# Patient Record
Sex: FEMALE | Race: OTHER | NOT HISPANIC OR LATINO
[De-identification: names, ages, dates, MRNs, and addresses within clinical notes are randomized per-mention and may not be internally consistent; named-entity substitution may affect disease eponyms.]

---

## 2022-01-10 PROBLEM — Z00.00 ENCOUNTER FOR PREVENTIVE HEALTH EXAMINATION: Status: ACTIVE | Noted: 2022-01-10

## 2022-01-11 ENCOUNTER — APPOINTMENT (OUTPATIENT)
Dept: HEART AND VASCULAR | Facility: CLINIC | Age: 67
End: 2022-01-11
Payer: MEDICARE

## 2022-01-11 VITALS
TEMPERATURE: 98.6 F | HEART RATE: 90 BPM | WEIGHT: 102 LBS | OXYGEN SATURATION: 97 % | BODY MASS INDEX: 19.26 KG/M2 | DIASTOLIC BLOOD PRESSURE: 82 MMHG | SYSTOLIC BLOOD PRESSURE: 122 MMHG | RESPIRATION RATE: 16 BRPM | HEIGHT: 61 IN

## 2022-01-11 DIAGNOSIS — Z78.9 OTHER SPECIFIED HEALTH STATUS: ICD-10-CM

## 2022-01-11 DIAGNOSIS — Z72.89 OTHER PROBLEMS RELATED TO LIFESTYLE: ICD-10-CM

## 2022-01-11 DIAGNOSIS — R10.9 UNSPECIFIED ABDOMINAL PAIN: ICD-10-CM

## 2022-01-11 DIAGNOSIS — R94.31 ABNORMAL ELECTROCARDIOGRAM [ECG] [EKG]: ICD-10-CM

## 2022-01-11 DIAGNOSIS — Z80.42 FAMILY HISTORY OF MALIGNANT NEOPLASM OF PROSTATE: ICD-10-CM

## 2022-01-11 DIAGNOSIS — Z82.49 FAMILY HISTORY OF ISCHEMIC HEART DISEASE AND OTHER DISEASES OF THE CIRCULATORY SYSTEM: ICD-10-CM

## 2022-01-11 DIAGNOSIS — H93.19 TINNITUS, UNSPECIFIED EAR: ICD-10-CM

## 2022-01-11 DIAGNOSIS — R07.89 OTHER CHEST PAIN: ICD-10-CM

## 2022-01-11 DIAGNOSIS — Z87.898 PERSONAL HISTORY OF OTHER SPECIFIED CONDITIONS: ICD-10-CM

## 2022-01-11 PROCEDURE — 99204 OFFICE O/P NEW MOD 45 MIN: CPT

## 2022-01-11 PROCEDURE — 36415 COLL VENOUS BLD VENIPUNCTURE: CPT

## 2022-01-11 PROCEDURE — 93000 ELECTROCARDIOGRAM COMPLETE: CPT

## 2022-01-11 RX ORDER — GALCANEZUMAB 120 MG/ML
120 INJECTION, SOLUTION SUBCUTANEOUS
Qty: 1 | Refills: 0 | Status: ACTIVE | COMMUNITY
Start: 2022-01-11

## 2022-01-11 RX ORDER — GABAPENTIN 100 MG/1
100 CAPSULE ORAL DAILY
Qty: 30 | Refills: 0 | Status: ACTIVE | COMMUNITY
Start: 2022-01-11

## 2022-01-11 NOTE — REVIEW OF SYSTEMS
[Headache] : headache [Tinnitus] : tinnitus [Nausea] : nausea [Negative] : Genitourinary [SOB] : no shortness of breath [Vomiting] : no vomiting [FreeTextEntry2] : see HPI [FreeTextEntry5] : see HPI [FreeTextEntry7] : see HPI [FreeTextEntry9] : see HPI [de-identified] : see HPI
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
## 2022-01-11 NOTE — PHYSICAL EXAM
[Well Developed] : well developed [Normal Conjunctiva] : normal conjunctiva [No Edema] : no edema [No Rash] : no rash [Moves all extremities] : moves all extremities [Normal] : alert and oriented, normal memory

## 2022-01-11 NOTE — ASSESSMENT
[FreeTextEntry1] : An EKG was performed to evaluate for arrhythmia and ischemia. \par \par Abnormal EKG, palpitations, hand pain-- Labs were drawn in the office and sent. See Hand Ortho. Obtain prior records. Preventive care. Limit alcohol to 1 drink in 24 hours maximum

## 2022-01-12 LAB
ALBUMIN SERPL ELPH-MCNC: 4.7 G/DL
ALP BLD-CCNC: 75 U/L
ALT SERPL-CCNC: 29 U/L
AMYLASE/CREAT SERPL: 105 U/L
ANION GAP SERPL CALC-SCNC: 13 MMOL/L
APPEARANCE: CLEAR
AST SERPL-CCNC: 26 U/L
BACTERIA: NEGATIVE
BASOPHILS # BLD AUTO: 0.05 K/UL
BASOPHILS NFR BLD AUTO: 1 %
BILIRUB SERPL-MCNC: 0.6 MG/DL
BILIRUBIN URINE: NEGATIVE
BLOOD URINE: NEGATIVE
BUN SERPL-MCNC: 14 MG/DL
CALCIUM SERPL-MCNC: 9.8 MG/DL
CHLORIDE SERPL-SCNC: 101 MMOL/L
CHOLEST SERPL-MCNC: 206 MG/DL
CO2 SERPL-SCNC: 27 MMOL/L
COLOR: NORMAL
CREAT SERPL-MCNC: 0.9 MG/DL
EOSINOPHIL # BLD AUTO: 0.05 K/UL
EOSINOPHIL NFR BLD AUTO: 1 %
ESTIMATED AVERAGE GLUCOSE: 120 MG/DL
GLUCOSE QUALITATIVE U: NEGATIVE
GLUCOSE SERPL-MCNC: 102 MG/DL
HBA1C MFR BLD HPLC: 5.8 %
HCT VFR BLD CALC: 41 %
HDLC SERPL-MCNC: 117 MG/DL
HGB BLD-MCNC: 12.8 G/DL
HYALINE CASTS: 0 /LPF
IMM GRANULOCYTES NFR BLD AUTO: 0 %
KETONES URINE: NEGATIVE
LDLC SERPL CALC-MCNC: 82 MG/DL
LEUKOCYTE ESTERASE URINE: NEGATIVE
LPL SERPL-CCNC: 57 U/L
LYMPHOCYTES # BLD AUTO: 2.39 K/UL
LYMPHOCYTES NFR BLD AUTO: 50 %
MAGNESIUM SERPL-MCNC: 2.1 MG/DL
MAN DIFF?: NORMAL
MCHC RBC-ENTMCNC: 27.2 PG
MCHC RBC-ENTMCNC: 31.2 GM/DL
MCV RBC AUTO: 87.2 FL
MICROSCOPIC-UA: NORMAL
MONOCYTES # BLD AUTO: 0.39 K/UL
MONOCYTES NFR BLD AUTO: 8.2 %
NEUTROPHILS # BLD AUTO: 1.9 K/UL
NEUTROPHILS NFR BLD AUTO: 39.8 %
NITRITE URINE: NEGATIVE
NONHDLC SERPL-MCNC: 90 MG/DL
PH URINE: 6
PLATELET # BLD AUTO: 300 K/UL
POTASSIUM SERPL-SCNC: 4.6 MMOL/L
PROT SERPL-MCNC: 7 G/DL
PROTEIN URINE: NEGATIVE
RBC # BLD: 4.7 M/UL
RBC # FLD: 15.2 %
RED BLOOD CELLS URINE: 1 /HPF
SODIUM SERPL-SCNC: 141 MMOL/L
SPECIFIC GRAVITY URINE: 1.01
SQUAMOUS EPITHELIAL CELLS: 0 /HPF
TRIGL SERPL-MCNC: 40 MG/DL
TSH SERPL-ACNC: 1.94 UIU/ML
UROBILINOGEN URINE: NORMAL
WBC # FLD AUTO: 4.78 K/UL
WHITE BLOOD CELLS URINE: 1 /HPF

## 2022-12-05 ENCOUNTER — APPOINTMENT (OUTPATIENT)
Dept: PULMONOLOGY | Facility: CLINIC | Age: 67
End: 2022-12-05

## 2022-12-05 ENCOUNTER — NON-APPOINTMENT (OUTPATIENT)
Age: 67
End: 2022-12-05

## 2022-12-05 VITALS
HEART RATE: 102 BPM | OXYGEN SATURATION: 97 % | SYSTOLIC BLOOD PRESSURE: 128 MMHG | WEIGHT: 102 LBS | HEIGHT: 61 IN | BODY MASS INDEX: 19.26 KG/M2 | DIASTOLIC BLOOD PRESSURE: 74 MMHG

## 2022-12-05 PROCEDURE — 99204 OFFICE O/P NEW MOD 45 MIN: CPT

## 2022-12-05 RX ORDER — NORTRIPTYLINE HYDROCHLORIDE 10 MG/1
10 CAPSULE ORAL
Qty: 30 | Refills: 0 | Status: COMPLETED | COMMUNITY
Start: 2022-01-11 | End: 2022-12-05

## 2022-12-05 NOTE — PHYSICAL EXAM
[General Appearance - Well Developed] : well developed [Normal Appearance] : normal appearance [Well Groomed] : well groomed [General Appearance - Well Nourished] : well nourished [No Deformities] : no deformities [General Appearance - In No Acute Distress] : no acute distress [Normal Conjunctiva] : the conjunctiva exhibited no abnormalities [Eyelids - No Xanthelasma] : the eyelids demonstrated no xanthelasmas [Normal Oropharynx] : normal oropharynx [II] : II [Heart Rate And Rhythm] : heart rate was normal and rhythm regular [Heart Sounds] : normal S1 and S2 [Heart Sounds Gallop] : no gallops [Murmurs] : no murmurs [Heart Sounds Pericardial Friction Rub] : no pericardial rub [Auscultation Breath Sounds / Voice Sounds] : lungs were clear to auscultation bilaterally [Abnormal Walk] : normal gait [Musculoskeletal - Swelling] : no joint swelling seen [Motor Tone] : muscle strength and tone were normal [Nail Clubbing] : no clubbing of the fingernails [Cyanosis, Localized] : no localized cyanosis [Petechial Hemorrhages (___cm)] : no petechial hemorrhages [Skin Color & Pigmentation] : normal skin color and pigmentation [Skin Turgor] : normal skin turgor [] : no rash [Deep Tendon Reflexes (DTR)] : deep tendon reflexes were 2+ and symmetric [Sensation] : the sensory exam was normal to light touch and pinprick [No Focal Deficits] : no focal deficits [Oriented To Time, Place, And Person] : oriented to person, place, and time [Impaired Insight] : insight and judgment were intact [Affect] : the affect was normal [Erythema] : no erythema of the pharynx

## 2022-12-05 NOTE — ASSESSMENT
[FreeTextEntry1] : Describes awakening with apnea and some snoring, but the description of pain with this is very atypical.  I wonder if what she is really describing is gastroesophageal reflux, but she tells me she has been evaluated for this in the past.  I have asked her to have home unattended sleep study as a first approach.  She has had very difficult to treat migraine headaches, and sleep disturbance may be a big trigger to this.  Follow-up after sleep study.

## 2023-01-11 ENCOUNTER — APPOINTMENT (OUTPATIENT)
Dept: SLEEP CENTER | Facility: HOME HEALTH | Age: 68
End: 2023-01-11
Payer: MEDICARE

## 2023-01-11 ENCOUNTER — OUTPATIENT (OUTPATIENT)
Dept: OUTPATIENT SERVICES | Facility: HOSPITAL | Age: 68
LOS: 1 days | End: 2023-01-11
Payer: MEDICARE

## 2023-01-11 PROCEDURE — 95800 SLP STDY UNATTENDED: CPT | Mod: 26

## 2023-01-11 PROCEDURE — 95800 SLP STDY UNATTENDED: CPT

## 2023-01-12 DIAGNOSIS — G47.33 OBSTRUCTIVE SLEEP APNEA (ADULT) (PEDIATRIC): ICD-10-CM

## 2023-02-14 ENCOUNTER — APPOINTMENT (OUTPATIENT)
Dept: PULMONOLOGY | Facility: CLINIC | Age: 68
End: 2023-02-14
Payer: MEDICARE

## 2023-02-14 PROCEDURE — 99443: CPT

## 2023-02-14 NOTE — HISTORY OF PRESENT ILLNESS
[FreeTextEntry1] : 2/14/23: Reviewed recent unattended home sleep testing with patient: No significant sleep disordered breathing was observed.  TST 6 h 39 min.  Patient states better than usual, reports 3 nights on average week when wakes feeling like she cannot breathe.  Has complaints of chronic post nasal drip, sees ENT on flonase and azelastine. Stopped any alcohol use without change.

## 2023-02-14 NOTE — ASSESSMENT
[FreeTextEntry1] : Negative.hst with continued complaints of waking choking, gasping, possible obstructive sleep apnea.  Suggest she f/u with ENT, will order overnight polysomnography with high clinical suspicion of sleep disordered breathing despite normal unattended home sleep testing

## 2023-03-27 ENCOUNTER — OUTPATIENT (OUTPATIENT)
Dept: OUTPATIENT SERVICES | Facility: HOSPITAL | Age: 68
LOS: 1 days | End: 2023-03-27
Payer: MEDICARE

## 2023-03-27 ENCOUNTER — APPOINTMENT (OUTPATIENT)
Dept: SLEEP CENTER | Facility: HOSPITAL | Age: 68
End: 2023-03-27

## 2023-03-27 DIAGNOSIS — G47.33 OBSTRUCTIVE SLEEP APNEA (ADULT) (PEDIATRIC): ICD-10-CM

## 2023-03-27 PROCEDURE — 95810 POLYSOM 6/> YRS 4/> PARAM: CPT | Mod: 26

## 2023-03-27 PROCEDURE — 95810 POLYSOM 6/> YRS 4/> PARAM: CPT

## 2023-04-18 ENCOUNTER — TRANSCRIPTION ENCOUNTER (OUTPATIENT)
Age: 68
End: 2023-04-18

## 2023-04-18 ENCOUNTER — APPOINTMENT (OUTPATIENT)
Dept: PULMONOLOGY | Facility: CLINIC | Age: 68
End: 2023-04-18
Payer: MEDICARE

## 2023-04-18 PROCEDURE — 99214 OFFICE O/P EST MOD 30 MIN: CPT | Mod: 95

## 2023-04-18 NOTE — HISTORY OF PRESENT ILLNESS
[FreeTextEntry1] : 2/14/23: Reviewed recent unattended home sleep testing with patient: No significant sleep disordered breathing was observed.  TST 6 h 39 min.  Patient states better than usual, reports 3 nights on average week when wakes feeling like she cannot breathe.  Has complaints of chronic post nasal drip, sees ENT on flonase and azelastine. Stopped any alcohol use without change.\par \par 4/18/2023: Overnight polysomnography performed March 27, 2023 reviewed with patient.  She slept poorly with total sleep time of just over 4 hours, did have mild to moderate sleep apnea, apnea-hypopnea index 12.1 (4%), 15.6 (3%).  Since I last saw her she has seen an ear nose and throat doctor who tells her that she has a crowded oropharynx is actually recommended physical therapy.\par \par Her sleep habits appear irregular, time between 11 and 12, up to 2 hours sleep latency, irregular wake time, sometimes as late as 10.  She continues to complain of episodes of awakening with a feeling of gasping and choking.

## 2023-04-18 NOTE — ASSESSMENT
[FreeTextEntry1] : Likely mild sleep disordered breathing\par Physiologic insomnia\par \par Discussed sleep hygiene at length.  Suggested mild sleep restriction- adjust bed hours to expected sleep duration. If not sleepy, get out of bed.  Keep consistent wake time no matter how poor perceived sleep is.  Relaxing time before bed, "worry time" before that.  Suggested sleep schedule of 1130 bedtime, 7  am wake time. \par \par Treatment options for sleep disordered breathing were discussed.  The most rapid and successful treatment remains nasal CPAP or BilevelPAP.  Alternatives include upper airway surgery such as uvulopharyngoplasty or a dental appliance (better for milder cases).  Recently hypoglossal nerve stimulation has been used.  Positional therapy (avoidance of supine posture) can be helpful, and all patients should try to maintain a healthy weight and avoid alcohol or other sedating medications close to bedtime \par \par We will follow-up with her ENT physician, consider initiating CPAP.  Follow-up in 1 month or earlier.

## 2023-12-18 ENCOUNTER — APPOINTMENT (OUTPATIENT)
Dept: PULMONOLOGY | Facility: CLINIC | Age: 68
End: 2023-12-18
Payer: MEDICARE

## 2023-12-18 VITALS
OXYGEN SATURATION: 96 % | DIASTOLIC BLOOD PRESSURE: 68 MMHG | HEART RATE: 72 BPM | BODY MASS INDEX: 18.88 KG/M2 | TEMPERATURE: 96.6 F | HEIGHT: 61 IN | SYSTOLIC BLOOD PRESSURE: 111 MMHG | WEIGHT: 100 LBS

## 2023-12-18 PROCEDURE — 99214 OFFICE O/P EST MOD 30 MIN: CPT

## 2023-12-18 NOTE — ASSESSMENT
[FreeTextEntry1] : Recently starting to huy with CPAP, excellent compliance and efficacy.  Benefiting from usage.  We may discuss other options in the future but for now she will continue on CPAP.  Given long term advice about CPAP use.  Call durable medical equipment provider if any equipment problems.  Replace interface as per schedule, generally at least every 3 months.  Stressed importance of CPAP compliance.  Return to see me in about 2 months if doing well.   Time spent today includes download and review of CPAP compliance and efficacy report

## 2023-12-18 NOTE — HISTORY OF PRESENT ILLNESS
[FreeTextEntry1] : 2/14/23: Reviewed recent unattended home sleep testing with patient: No significant sleep disordered breathing was observed.  TST 6 h 39 min.  Patient states better than usual, reports 3 nights on average week when wakes feeling like she cannot breathe.  Has complaints of chronic post nasal drip, sees ENT on flonase and azelastine. Stopped any alcohol use without change.  4/18/2023: Overnight polysomnography performed March 27, 2023 reviewed with patient.  She slept poorly with total sleep time of just over 4 hours, did have mild to moderate sleep apnea, apnea-hypopnea index 12.1 (4%), 15.6 (3%).  Since I last saw her she has seen an ear nose and throat doctor who tells her that she has a crowded oropharynx is actually recommended physical therapy.  Her sleep habits appear irregular, time between 11 and 12, up to 2 hours sleep latency, irregular wake time, sometimes as late as 10.  She continues to complain of episodes of awakening with a feeling of gasping and choking.  12/18/23: She received CPAP at home on October 25, at first was having a lot of problems with this, but changed to a more comfortable interface, standard nasal mask, and since about December 1 has been doing quite well with this.  Compliance report today shows she she has now used it for 18 days, 14 of which were more than 4 hours, apnea-hypopnea index on treatment 1.5, leak is acceptable, 95th percentile pressure 11.1.  She is just starting to see improvement in her daytime function and sleep quality. No significant medical history or changes in medication since last visit.

## 2024-03-13 ENCOUNTER — APPOINTMENT (OUTPATIENT)
Dept: PULMONOLOGY | Facility: CLINIC | Age: 69
End: 2024-03-13
Payer: MEDICARE

## 2024-03-13 VITALS
OXYGEN SATURATION: 97 % | SYSTOLIC BLOOD PRESSURE: 158 MMHG | HEIGHT: 61 IN | WEIGHT: 102 LBS | BODY MASS INDEX: 19.26 KG/M2 | TEMPERATURE: 98.42 F | HEART RATE: 83 BPM | DIASTOLIC BLOOD PRESSURE: 84 MMHG

## 2024-03-13 DIAGNOSIS — G47.33 OBSTRUCTIVE SLEEP APNEA (ADULT) (PEDIATRIC): ICD-10-CM

## 2024-03-13 PROCEDURE — 99214 OFFICE O/P EST MOD 30 MIN: CPT

## 2024-03-13 NOTE — PHYSICAL EXAM
PA done   [General Appearance - Well Developed] : well developed [Normal Appearance] : normal appearance [Well Groomed] : well groomed [General Appearance - Well Nourished] : well nourished [No Deformities] : no deformities [Normal Oropharynx] : normal oropharynx [General Appearance - In No Acute Distress] : no acute distress [Erythema] : no erythema of the pharynx [II] : II [Heart Rate And Rhythm] : heart rate was normal and rhythm regular [Heart Sounds Gallop] : no gallops [Heart Sounds] : normal S1 and S2 [Murmurs] : no murmurs [Heart Sounds Pericardial Friction Rub] : no pericardial rub [Auscultation Breath Sounds / Voice Sounds] : lungs were clear to auscultation bilaterally [] : no respiratory distress

## 2024-03-13 NOTE — ASSESSMENT
[FreeTextEntry1] : Obstructive sleep apnea Usage of CPAP has been a little sporadic, but subjectively improved here, and her sleep apnea at baseline is mild.  I have encouraged her to continue to use CPAP.  She has no significant lung disease and this degree of obstructive sleep apnea should have no significance for perioperative care if she needs spine surgery.

## 2024-12-25 PROBLEM — F10.90 ALCOHOL USE: Status: ACTIVE | Noted: 2022-01-11

## 2025-09-15 ENCOUNTER — NON-APPOINTMENT (OUTPATIENT)
Age: 70
End: 2025-09-15